# Patient Record
Sex: FEMALE | Race: WHITE | Employment: FULL TIME | URBAN - METROPOLITAN AREA
[De-identification: names, ages, dates, MRNs, and addresses within clinical notes are randomized per-mention and may not be internally consistent; named-entity substitution may affect disease eponyms.]

---

## 2017-05-31 ENCOUNTER — HOSPITAL ENCOUNTER (OUTPATIENT)
Dept: PHYSICAL THERAPY | Age: 67
Discharge: HOME | End: 2017-05-31
Payer: COMMERCIAL

## 2017-05-31 PROCEDURE — 97162 PT EVAL MOD COMPLEX 30 MIN: CPT | Performed by: PHYSICAL THERAPIST

## 2017-05-31 PROCEDURE — 97110 THERAPEUTIC EXERCISES: CPT | Performed by: PHYSICAL THERAPIST
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
## 2017-06-02 ENCOUNTER — HOSPITAL ENCOUNTER (OUTPATIENT)
Dept: PHYSICAL THERAPY | Age: 67
Discharge: HOME | End: 2017-06-02
Payer: COMMERCIAL

## 2017-06-02 PROCEDURE — 97035 APP MDLTY 1+ULTRASOUND EA 15: CPT

## 2017-06-02 PROCEDURE — 97110 THERAPEUTIC EXERCISES: CPT

## 2017-06-02 PROCEDURE — 97014 ELECTRIC STIMULATION THERAPY: CPT

## 2017-06-02 PROCEDURE — 97140 MANUAL THERAPY 1/> REGIONS: CPT

## 2017-06-02 NOTE — PROGRESS NOTES
PT DAILY TREATMENT NOTE     Patient Name: Nadege Callaway  Date:2017  : 1950  [x]  Patient  Verified  Payor: Alisha Giron / [place]: [place] [employer]  [facility] PT / Product Type: Commerical /    In time:10:57  Out time:12:00  Total Treatment Time (min): 61  Visit #: 2 of     Treatment Area: Pain in right knee [M25.561]  Pain in left knee [M25.562]    SUBJECTIVE  Pain Level IN: (0-10 scale): (R) 1/10 and (L) 2-3/10   Pain Level OUT: (0-10 scale) post treatment: numb    Any medication changes, allergies to medications, adverse drug reactions, diagnosis change, or new procedure performed?: [x] No    [] Yes (see summary sheet for update)  Subjective functional status/changes:   [] No changes reported  I would say that the (L) knee hurts worse than my (R) one- but they both kind of hurt in the same spot over the inside of my knees    OBJECTIVE    Modality rationale: decrease edema, decrease inflammation and decrease pain to improve the patients ability to ambulate and perform ADLs   Min Type Additional Details   15+5 set [x] Estim:  [x]Unatt       [x]IFC  []Premod                        []Other:  [x]w/ice   []w/heat  Position: in long sitting  Location: to (B) knee - medially    [] Estim: []Att    []TENS instruct  []NMES                    []Other:  []w/US   []w/ice   []w/heat  Position:  Location:    []  Traction: [] Cervical       []Lumbar                       [] Prone          []Supine                       []Intermittent   []Continuous Lbs:  [] before manual  [] after manual   10 [x]  Ultrasound: []Continuous   [x] Pulsed                           [x]1MHz   []3MHz W/cm2: 1.5  Location: 5 min each to medial knee and bursa     []  Iontophoresis with dexamethasone         Location: [] Take home patch   [] In clinic    []  Ice     []  heat  []  Ice massage  []  Laser   []  Anodyne Position:  Location:    []  Laser with stim  []  Other:  Position:  Location:    []  Vasopneumatic Device Pressure:       [] lo [] med [] hi   Temperature: [] lo [] med [] hi   [] Skin assessment post-treatment:  []intact []redness- no adverse reaction    []redness - adverse reaction:       23 min Therapeutic Exercise:  [] See flow sheet : first follow up visit since initial evaluation - initiated POC per flow sheet    Rationale: increase ROM, increase strength and improve balance to improve the patients ability to achieve below goals      10 min Manual Therapy:  PROM to the (L) knee, patella mobs and CFM over (B) medial knee structures and gentle edema massage to (B) pes anserinus bursa    Rationale: decrease pain, increase ROM, increase tissue extensibility, decrease edema  and decrease trigger points to (B) knees       With   [] TE   [] TA   [] neuro   [] other: Patient Education: [x] Review HEP    [] Progressed/Changed HEP based on:   [] positioning   [] body mechanics   [] transfers   [] heat/ice application    [] other:      Other Objective/Functional Measures: first follow up visit since initial evaluation - initiated POC per flow sheet       Performed pulsed US to (B) pes anserinus area - visible and palpable edema to both (L)>(R) - with increase discomfort reported with minimal palpable to arae           ASSESSMENT/Changes in Function: patient was challenged with first follow up visit with exercise secondary to weakness and fatigue noted and pain over (B) pes anserinus - will progress as able towards goals     Patient will continue to benefit from skilled PT services to modify and progress therapeutic interventions, address functional mobility deficits, address ROM deficits, address strength deficits, analyze and address soft tissue restrictions, analyze and cue movement patterns, assess and modify postural abnormalities and instruct in home and community integration to attain remaining goals.      [x]  See Plan of Care  []  See progress note/recertification  []  See Discharge Summary         Progress towards goals / Updated goals:  · Short Term Goals: To be accomplished in 1 weeks:  1. Pt will be IND and compliant with HEP for self-management of symptoms. · Long Term Goals: To be accomplished in 4 weeks:  1. Pt will improve B LE strength to 5/5 to improve gait stability. 2. Pt will improve B eccentric Quad strength as noted with ability to perform 6\" step down with good form to improve stair negotiation. 3. Pt will be able to stand and walk for 30 min without restrictions to return to [facility] symptom free.   4. Pt will improve FOTO score to at least 64/100 as a functional indicator of improved mobility    PLAN  [x]  Upgrade activities as tolerated     [x]  Continue plan of care  []  Update interventions per flow sheet       []  Discharge due to:_  []  Other:_      Taylor De Souza, GARRET 6/2/2017  11:22 AM    Future Appointments  Date Time Provider Alda Fields   6/5/2017 11:30 AM Sandra Simon DPT [facility]   6/7/2017 11:30 AM Sandra Simon DPT [facility]   6/12/2017 12:00 PM Sandra Simon DPT [facility]   6/14/2017 11:30 AM Sandra Simon DPT [facility]   6/16/2017 11:30 AM [facility] PT [place] [facility]   6/19/2017 11:30 AM [facility] [place] [facility]   6/21/2017 11:30 AM Sandra Simon DPT [facility]   6/23/2017 11:30 AM [facility] [place] [facility]   6/26/2017 3:00 PM [facility] [place] [facility]   6/28/2017 11:30 AM Sandra Simon DPT [facility]   6/30/2017 10:30 AM Sandra Simon DPT [facility]

## 2017-06-05 ENCOUNTER — HOSPITAL ENCOUNTER (OUTPATIENT)
Dept: PHYSICAL THERAPY | Age: 67
Discharge: HOME | End: 2017-06-05
Payer: COMMERCIAL

## 2017-06-05 PROCEDURE — 97014 ELECTRIC STIMULATION THERAPY: CPT | Performed by: PHYSICAL THERAPIST

## 2017-06-05 PROCEDURE — 97110 THERAPEUTIC EXERCISES: CPT | Performed by: PHYSICAL THERAPIST
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
## 2017-06-07 ENCOUNTER — HOSPITAL ENCOUNTER (OUTPATIENT)
Dept: PHYSICAL THERAPY | Age: 67
Discharge: HOME | End: 2017-06-07
Payer: COMMERCIAL

## 2017-06-07 PROCEDURE — 97035 APP MDLTY 1+ULTRASOUND EA 15: CPT | Performed by: PHYSICAL THERAPIST

## 2017-06-07 PROCEDURE — 97110 THERAPEUTIC EXERCISES: CPT | Performed by: PHYSICAL THERAPIST

## 2017-06-07 NOTE — PROGRESS NOTES
PT DAILY TREATMENT NOTE     Patient Name: Mickie Hearn  Date:2017  : 1950  [x]  Patient  Verified  Payor: Patty Llamas / Plan: [place] [employer]  CAPITATED PT / Product Type: Commerical /    In time:1127  Out time:1235  Total Treatment Time (min): 68  Visit #: 4 of     Treatment Area: Pain in right knee [M25.561]  Pain in left knee [M25.562]    SUBJECTIVE  Pain Level IN: (0-10 scale): 3.5/10  Pain Level OUT: (0-10 scale) post treatment: 10    Any medication changes, allergies to medications, adverse drug reactions, diagnosis change, or new procedure performed?: [x] No    [] Yes (see summary sheet for update)  Subjective functional status/changes:   [] No changes reported  Pt reports that she was really sore after last session.      OBJECTIVE    Modality rationale: decrease edema, decrease inflammation and decrease pain to improve the patients ability to ambulate and perform ADLs   Min Type Additional Details   H [x] Estim:  [x]Unatt       [x]IFC  []Premod                        []Other:  [x]w/ice   []w/heat  Position: in long sitting  Location: to (B) knee - medially    [] Estim: []Att    []TENS instruct  []NMES                    []Other:  []w/US   []w/ice   []w/heat  Position:  Location:    []  Traction: [] Cervical       []Lumbar                       [] Prone          []Supine                       []Intermittent   []Continuous Lbs:  [] before manual  [] after manual   8 [x]  Ultrasound: []Continuous   [x] Pulsed                           [x]1MHz   []3MHz W/cm2: 1.0  Location: B pes Anserine    []  Iontophoresis with dexamethasone         Location: [] Take home patch   [] In clinic   10 [x]  Ice     []  heat  []  Ice massage  []  Laser   []  Anodyne Position:semi-reclined  Location: B knees    []  Laser with stim  []  Other:  Position:  Location:    []  Vasopneumatic Device Pressure:       [] lo [] med [] hi   Temperature: [] lo [] med [] hi   [] Skin assessment post-treatment:  []intact []redness- no adverse reaction    []redness - adverse reaction:       50 min Therapeutic Exercise:  [x] See flow sheet :    Rationale: increase ROM, increase strength and improve balance to improve the patients ability to achieve below goals      H min Manual Therapy:  PROM to the (L) knee, patella mobs and CFM over (B) medial knee structures and gentle edema massage to (B) pes anserinus bursa    Rationale: decrease pain, increase ROM, increase tissue extensibility, decrease edema  and decrease trigger points to (B) knees       With   [] TE   [] TA   [] neuro   [] other: Patient Education: [x] Review HEP    [] Progressed/Changed HEP based on:   [] positioning   [] body mechanics   [] transfers   [] heat/ice application    [] other:      Other Objective/Functional Measures:  Pt reported no change in symptoms with IFC last session. Would like to try US instead today  Pt tolerated all therex without increased pain      ASSESSMENT/Changes in Function:   Pt tolerated today's therex with overall decrease in pain levels. Continue to progress as tolerated per current POC. Patient will continue to benefit from skilled PT services to modify and progress therapeutic interventions, address functional mobility deficits, address ROM deficits, address strength deficits, analyze and address soft tissue restrictions, analyze and cue movement patterns, assess and modify postural abnormalities and instruct in home and community integration to attain remaining goals. Progress towards goals / Updated goals: · Short Term Goals: To be accomplished in 1 weeks:  1. Pt will be IND and compliant with HEP for self-management of symptoms. MET PER PT REPORT 6/5/17  · Long Term Goals: To be accomplished in 4 weeks:  1. Pt will improve B LE strength to 5/5 to improve gait stability. 2. Pt will improve B eccentric Quad strength as noted with ability to perform 6\" step down with good form to improve stair negotiation.   3. Pt will be able to stand and walk for 30 min without restrictions to return to OF symptom free.   4. Pt will improve FOTO score to at least 64/100 as a functional indicator of improved mobility    PLAN  [x]  Upgrade activities as tolerated     [x]  Continue plan of care  []  Update interventions per flow sheet       []  Discharge due to:_  []  Other:_      Blanca Kilpatrick DPT 6/7/2017  239 pm    Future Appointments  Date Time Provider Alda Fields   6/12/2017 11:30 AM Blanca Kilpatrick DPT [facility]   6/14/2017 11:30 AM Blanca Kilpatrick DPT [facility]   6/16/2017 11:30 AM [facility] PT [facility]   6/19/2017 11:30 AM [facility] PT [facility]   6/21/2017 11:30 AM Blanca Kilpatrick DPT [facility]   6/23/2017 11:30 AM [facility] PT [facility]   6/26/2017 3:00 PM [facility] PT [facility]   6/28/2017 11:30 AM Blanca Kilpatrick DPT [facility]   6/30/2017 10:30 AM Blanca Kilpatrick DPT [facility]

## 2017-06-09 ENCOUNTER — APPOINTMENT (OUTPATIENT)
Dept: PHYSICAL THERAPY | Age: 67
End: 2017-06-09
Payer: COMMERCIAL

## 2017-06-12 ENCOUNTER — HOSPITAL ENCOUNTER (OUTPATIENT)
Dept: PHYSICAL THERAPY | Age: 67
Discharge: HOME | End: 2017-06-12
Payer: COMMERCIAL

## 2017-06-12 PROCEDURE — 97035 APP MDLTY 1+ULTRASOUND EA 15: CPT

## 2017-06-12 PROCEDURE — 97110 THERAPEUTIC EXERCISES: CPT

## 2017-06-12 NOTE — PROGRESS NOTES
PT DAILY TREATMENT NOTE     Patient Name: Marcin Busch  Date:2017  : 1950  [x]  Patient  Verified  Payor: Aaron Velazquez / [place]: [place] [employer]  CAPITATED PT / Product Type: Commerical /    In time:1135  Out time:1247  Total Treatment Time (min): 62  Visit #: 5 of     Treatment Area: Pain in right knee [M25.561]  Pain in left knee [M25.562]    SUBJECTIVE  Pain Level IN: (0-10 scale): 2/10 (L) and 0/10 (R)  Pain Level OUT: (0-10 scale) post treatment: 0/10    Any medication changes, allergies to medications, adverse drug reactions, diagnosis change, or new procedure performed?: [x] No    [] Yes (see summary sheet for update)  Subjective functional status/changes:   [] No changes reported  I still feel that knot under my skin on the (L) knee that hurts and the (R) knee really only hurts when I push down into that spot.      OBJECTIVE    Modality rationale: decrease edema, decrease inflammation and decrease pain to improve the patients ability to ambulate and perform ADLs   Min Type Additional Details   H [x] Estim:  [x]Unatt       [x]IFC  []Premod                        []Other:  [x]w/ice   []w/heat  Position: in long sitting  Location: to (B) knee - medially    [] Estim: []Att    []TENS instruct  []NMES                    []Other:  []w/US   []w/ice   []w/heat  Position:  Location:    []  Traction: [] Cervical       []Lumbar                       [] Prone          []Supine                       []Intermittent   []Continuous Lbs:  [] before manual  [] after manual   10 [x]  Ultrasound: []Continuous   [x] Pulsed                           [x]1MHz   []3MHz W/cm2: 1.5 - 5 mins to each   Location: B pes Anserine    []  Iontophoresis with dexamethasone         Location: [] Take home patch   [] In clinic   10 [x]  Ice     []  heat  []  Ice massage  []  Laser   []  Anodyne Position:semi-reclined  Location: B knees    []  Laser with stim  []  Other:  Position:  Location:    []  Vasopneumatic Device Pressure: [] lo [] med [] hi   Temperature: [] lo [] med [] hi   [] Skin assessment post-treatment:  []intact []redness- no adverse reaction    []redness - adverse reaction:       42/37 min Therapeutic Exercise:  [x] See flow sheet : 5 min NC for warm up on bike   Rationale: increase ROM, increase strength and improve balance to improve the patients ability to achieve below goals       min Manual Therapy:    Rationale: decrease pain, increase ROM, increase tissue extensibility, decrease edema  and decrease trigger points to (B) knees       With   [] TE   [] TA   [] neuro   [] other: Patient Education: [x] Review HEP    [] Progressed/Changed HEP based on:   [] positioning   [] body mechanics   [] transfers   [] heat/ice application    [] other:      Other Objective/Functional Measures: patient continues to presents with a firm area to the medial aspect of (L) knee close to the pes anserinus         ASSESSMENT/Changes in Function:   Pt tolerated treatment well today - continues to present with pain with palpation over (B) pes anserinus the (L) greater than the (R). Patient will continue to benefit from skilled PT services to modify and progress therapeutic interventions, address functional mobility deficits, address ROM deficits, address strength deficits, analyze and address soft tissue restrictions, analyze and cue movement patterns, assess and modify postural abnormalities and instruct in home and community integration to attain remaining goals. Progress towards goals / Updated goals: · Short Term Goals: To be accomplished in 1 weeks:  1. Pt will be IND and compliant with HEP for self-management of symptoms. MET PER PT REPORT 6/5/17  · Long Term Goals: To be accomplished in 4 weeks:  1. Pt will improve B LE strength to 5/5 to improve gait stability. 2. Pt will improve B eccentric Quad strength as noted with ability to perform 6\" step down with good form to improve stair negotiation.   3. Pt will be able to stand and walk for 30 min without restrictions to return to OF symptom free.   4. Pt will improve FOTO score to at least 64/100 as a functional indicator of improved mobility    PLAN  [x]  Upgrade activities as tolerated     [x]  Continue plan of care  []  Update interventions per flow sheet       []  Discharge due to:_  []  Other:_      Sudarshan Sanchez, PTA 6/12/2017  239 pm    Future Appointments  Date Time Provider Alda iFelds   6/14/2017 11:30 AM Bishnu Chase DPT [facility]   6/16/2017 11:30 AM [facility] PT [place] [facility]   6/19/2017 11:30 AM [facility] [place] [facility]   6/21/2017 11:30 AM Bishnu Chase DPT [facility]   6/23/2017 11:30 AM [facility] PT [place] [facility]   6/26/2017 3:00 PM [facility] PT [facility]   6/28/2017 11:30 AM Bishnu Chase DPT [facility]   6/30/2017 10:30 AM Bishnu Chase DPT [facility]

## 2017-06-14 ENCOUNTER — HOSPITAL ENCOUNTER (OUTPATIENT)
Dept: PHYSICAL THERAPY | Age: 67
Discharge: HOME | End: 2017-06-14
Payer: COMMERCIAL

## 2017-06-14 PROCEDURE — 97110 THERAPEUTIC EXERCISES: CPT | Performed by: PHYSICAL THERAPIST

## 2017-06-14 PROCEDURE — 97035 APP MDLTY 1+ULTRASOUND EA 15: CPT | Performed by: PHYSICAL THERAPIST

## 2017-06-14 NOTE — PROGRESS NOTES
PT DAILY TREATMENT NOTE     Patient Name: Cem David  Date:2017  : 1950  [x]  Patient  Verified  Payor: Jorge Malhotra / [place]: [place] [employer]  CAPITATED PT / Product Type: Commerical /    In time:1134  Out time:1237  Total Treatment Time (min): 61  Visit #: 6 of     Treatment Area: Pain in right knee [M25.561]  Pain in left knee [M25.562]    SUBJECTIVE  Pain Level IN: (0-10 scale): 0/10 (L) and 0/10 (R)  Pain Level OUT: (0-10 scale) post treatment: 0/10    Any medication changes, allergies to medications, adverse drug reactions, diagnosis change, or new procedure performed?: [x] No    [] Yes (see summary sheet for update)  Subjective functional status/changes:   [] No changes reported  \"I am still achy but I am feeling good\"    OBJECTIVE    Modality rationale: decrease edema, decrease inflammation and decrease pain to improve the patients ability to ambulate and perform ADLs   Min Type Additional Details   H [x] Estim:  [x]Unatt       [x]IFC  []Premod                        []Other:  [x]w/ice   []w/heat  Position: in long sitting  Location: to (B) knee - medially    [] Estim: []Att    []TENS instruct  []NMES                    []Other:  []w/US   []w/ice   []w/heat  Position:  Location:    []  Traction: [] Cervical       []Lumbar                       [] Prone          []Supine                       []Intermittent   []Continuous Lbs:  [] before manual  [] after manual   8 [x]  Ultrasound: []Continuous   [x] Pulsed                           [x]1MHz   []3MHz W/cm2: 1.5 - 4 mins to each   Location: B pes Anserine    []  Iontophoresis with dexamethasone         Location: [] Take home patch   [] In clinic   10 [x]  Ice     []  heat  []  Ice massage  []  Laser   []  Anodyne Position:semi-reclined  Location: B knees    []  Laser with stim  []  Other:  Position:  Location:    []  Vasopneumatic Device Pressure:       [] lo [] med [] hi   Temperature: [] lo [] med [] hi   [] Skin assessment post-treatment: []intact []redness- no adverse reaction    []redness - adverse reaction:       45 min Therapeutic Exercise:  [x] See flow sheet :    Rationale: increase ROM, increase strength and improve balance to improve the patients ability to achieve below goals    With   [] TE   [] TA   [] neuro   [] other: Patient Education: [x] Review HEP    [] Progressed/Changed HEP based on:   [] positioning   [] body mechanics   [] transfers   [] heat/ice application    [] other:      Other Objective/Functional Measures:       ASSESSMENT/Changes in Function:   Pt tolerated all therex without increased pain levels. Continue to progress as tolerated per current POC. Patient will continue to benefit from skilled PT services to modify and progress therapeutic interventions, address functional mobility deficits, address ROM deficits, address strength deficits, analyze and address soft tissue restrictions, analyze and cue movement patterns, assess and modify postural abnormalities and instruct in home and community integration to attain remaining goals. Progress towards goals / Updated goals: · Short Term Goals: To be accomplished in 1 weeks:  1. Pt will be IND and compliant with HEP for self-management of symptoms. MET PER PT REPORT 6/5/17  · Long Term Goals: To be accomplished in 4 weeks:  1. Pt will improve B LE strength to 5/5 to improve gait stability. 2. Pt will improve B eccentric Quad strength as noted with ability to perform 6\" step down with good form to improve stair negotiation. 3. Pt will be able to stand and walk for 30 min without restrictions to return to PLOF symptom free. PROGRESSING AS NOTED WITH DECREASED PAIN LEVELS WITH FUNCITONAL ACTIVITIES. 6/14/17  4.  Pt will improve FOTO score to at least 64/100 as a functional indicator of improved mobility    PLAN  [x]  Upgrade activities as tolerated     [x]  Continue plan of care  []  Update interventions per flow sheet       []  Discharge due to:_  []  Other:_ Denver Sera, DPT 6/14/2017  247 pm    Future Appointments  Date Time Provider Alda Fields   6/16/2017 11:30 AM [facility]   6/19/2017 11:30 AM [facility] PT [place] [facility]   6/21/2017 11:30 AM Denver Sera, DPT [facility]   6/23/2017 11:30 AM [facility] PT [place] [facility]   6/26/2017 3:00 PM Denver Sera, DPT [facility]   6/28/2017 11:30 AM Denver Sera, DPT [facility]   6/30/2017 10:30 AM Denver Sera, DPT [facility]

## 2017-06-16 ENCOUNTER — HOSPITAL ENCOUNTER (OUTPATIENT)
Dept: PHYSICAL THERAPY | Age: 67
Discharge: HOME | End: 2017-06-16
Payer: COMMERCIAL

## 2017-06-16 PROCEDURE — 97035 APP MDLTY 1+ULTRASOUND EA 15: CPT

## 2017-06-16 PROCEDURE — 97110 THERAPEUTIC EXERCISES: CPT

## 2017-06-16 NOTE — PROGRESS NOTES
PT DAILY TREATMENT NOTE     Patient Name: Zca Chavez  Date:2017  : 1950  [x]  Patient  Verified  Payor: Manish Cerna / Plan: [place] [employer]  CAPITATED PT / Product Type: Commerical /    In time: 11:37  Out time: 12:53  Total Treatment Time (min): 76  Visit #: 7 of     Treatment Area: Pain in right knee [M25.561]  Pain in left knee [M25.562]    SUBJECTIVE  Pain Level (0-10 scale): 0  Any medication changes, allergies to medications, adverse drug reactions, diagnosis change, or new procedure performed?: [x] No    [] Yes (see summary sheet for update)  Subjective functional status/changes:   [] No changes reported  \"I'm feeling much safer going down stairs. I used to have to go down (non-reciprocally), but now I can do it normally\". \"I'm going to see the doctor next Friday and I think I'm Elo Murguia ask him to inject the left knee because I think I have some bursa problems\". Pt states she will be out of town for 2 months after next Friday.     OBJECTIVE    Modality rationale: decrease edema, decrease inflammation and decrease pain to improve the patients ability to ambulate and perform ADLs   Min Type Additional Details   n/d [x] Estim:  [x]Unatt       [x]IFC  []Premod                        []Other:  [x]w/ice   []w/heat  Position: in long sitting  Location: to (B) knee- medially    [] Estim: []Att    []TENS instruct  []NMES                    []Other:  []w/US   []w/ice   []w/heat  Position:  Location:    []  Traction: [] Cervical       []Lumbar                       [] Prone          []Supine                       []Intermittent   []Continuous Lbs:  [] before manual  [] after manual   15 [x]  Ultrasound: []Continuous   [x] Pulsed                           [x]1MHz   []3MHz W/cm2: 1.5; 7 mins to each  Location: B pes Anserine    []  Iontophoresis with dexamethasone         Location: [] Take home patch   [] In clinic   p/d [x]  Ice     []  heat  []  Ice massage  []  Laser   []  Anodyne Position: semi-reclined  Location: B knees    []  Laser with stim  []  Other:  Position:  Location:    []  Vasopneumatic Device Pressure:       [] lo [] med [] hi   Temperature: [] lo [] med [] hi   [x] Skin assessment post-treatment:  [x]intact []redness- no adverse reaction    []redness - adverse reaction:         55 min Therapeutic Exercise:  [x] See flow sheet (-5 minutes bike)   Rationale: increase ROM, increase strength, improve balance and increase proprioception to improve the patients ability to perform functional mobility/ADLs and attain goals. With   [] TE   [] TA   [] neuro   [] other: Patient Education: [x] Review HEP    [] Progressed/Changed HEP based on:   [] positioning   [] body mechanics   [] transfers   [] heat/ice application    [] other:      Other Objective/Functional Measures:   -cues for form with lateral step ups to increase glute firing.  - increased step height for lateral/fwd step ups.   -added TG squats  -increased resistance with LAQ. Pain Level (0-10 scale) post treatment: 0    ASSESSMENT/Changes in Function: Pt demonstrates slow, steady progress with bilateral knee strength and mechanics as per ability to increase reps/resistance as per flow sheet. Pt with no adverse signs of pain c/o during today's session. She did have transient c/o dizziness on coming supine to sit which she states is related to recent sinus infection. Patient will continue to benefit from skilled PT services to modify and progress therapeutic interventions, address functional mobility deficits, address ROM deficits, address strength deficits, analyze and address soft tissue restrictions and analyze and cue movement patterns to attain remaining goals. []  See Plan of Care  []  See progress note/recertification  []  See Discharge Summary         Progress towards goals / Updated goals: · Short Term Goals: To be accomplished in 1 weeks:  1. Pt will be IND and compliant with HEP for self-management of symptoms. MET PER PT REPORT 6/5/17  · Long Term Goals: To be accomplished in 4 weeks:  1. Pt will improve B LE strength to 5/5 to improve gait stability. 2. Pt will improve B eccentric Quad strength as noted with ability to perform 6\" step down with good form to improve stair negotiation. 3. Pt will be able to stand and walk for 30 min without restrictions to return to PLOF symptom free. PROGRESSING AS NOTED WITH DECREASED PAIN LEVELS WITH FUNCITONAL ACTIVITIES. 6/14/17  4. Pt will improve FOTO score to at least 64/100 as a functional indicator of improved mobility    PLAN  []  Upgrade activities as tolerated     [x]  Continue plan of care  []  Update interventions per flow sheet       []  Discharge due to:_  []  Other:_      Ashwin Quiñonez, PT 6/16/2017  8:26 AM    Future Appointments  Date Time Provider Alda Fields   6/16/2017 11:30 AM [facility] PT [place] [facility]   6/19/2017 11:30 AM [facility]   6/21/2017 11:30 AM Saray Hernandes DPT [facility]   6/23/2017 11:30 AM [facility]   6/26/2017 3:00 PM Saray Hernandes DPT [facility]   6/28/2017 11:30 AM Saary Hernandes DPT [facility]   6/30/2017 10:30 AM Saray Hernandes DPT [facility]

## 2017-06-19 ENCOUNTER — HOSPITAL ENCOUNTER (OUTPATIENT)
Dept: PHYSICAL THERAPY | Age: 67
Discharge: HOME | End: 2017-06-19
Payer: COMMERCIAL

## 2017-06-19 PROCEDURE — 97110 THERAPEUTIC EXERCISES: CPT

## 2017-06-19 PROCEDURE — 97035 APP MDLTY 1+ULTRASOUND EA 15: CPT

## 2017-06-19 NOTE — PROGRESS NOTES
PT DAILY TREATMENT NOTE     Patient Name: Josie Borden  Date:2017  : 1950  [x]  Patient  Verified  Payor: Lory Diggs / Plan: [employer]  CAPITATED PT / Product Type: Commerical /    In time: 11:35  Out time: 12:39  Total Treatment Time (min): 64  Visit #: 8 of     Treatment Area: Pain in right knee [M25.561]  Pain in left knee [M25.562]    SUBJECTIVE  Pain Level (0-10 scale): 0  Any medication changes, allergies to medications, adverse drug reactions, diagnosis change, or new procedure performed?: [x] No    [] Yes (see summary sheet for update)  Subjective functional status/changes:   [] No changes reported  \"I did a lot of walking over the weekend. Went to [employer]. [place] a little tired after, not too painful\"      OBJECTIVE    Modality rationale: decrease pain to improve the patients ability to perform functional mobility/ADLs and attain goals.    Min Type Additional Details    [] Estim:  []Unatt       []IFC  []Premod                        []Other:  []w/ice   []w/heat  Position:  Location:    [] Estim: []Att    []TENS instruct  []NMES                    []Other:  []w/US   []w/ice   []w/heat  Position:  Location:    []  Traction: [] Cervical       []Lumbar                       [] Prone          []Supine                       []Intermittent   []Continuous Lbs:  [] before manual  [] after manual   10+3 minutes set up [x]  Ultrasound: []Continuous   [x] Pulsed                           [x]1MHz   []3MHz W/cm2: 1.5; 5 mins to each  Location: B pes anserine    []  Iontophoresis with dexamethasone         Location: [] Take home patch   [] In clinic    []  Ice     []  heat  []  Ice massage  []  Laser   []  Anodyne Position:  Location:    []  Laser with stim  []  Other:  Position:  Location:    []  Vasopneumatic Device Pressure:       [] lo [] med [] hi   Temperature: [] lo [] med [] hi   [] Skin assessment post-treatment:  []intact []redness- no adverse reaction    []redness - adverse reaction: 46 min Therapeutic Exercise:  [x] See flow sheet :(-5 minutes bike)   Rationale: increase ROM, increase strength, improve balance and increase proprioception to improve the patients ability to perform functional mobility/ADLs and attain goals. With   [] TE   [] TA   [] neuro   [] other: Patient Education: [x] Review HEP    [] Progressed/Changed HEP based on:   [] positioning   [] body mechanics   [] transfers   [] heat/ice application    [] other:      Other Objective/Functional Measures:   -progressed reps/resistance with TG squats     Pain Level (0-10 scale) post treatment: 0    ASSESSMENT/Changes in Function: Pt demonstrates improving strength/endurance with exercises as per decreased rest periods required today. Pt with ongoing TTP to left pes anserine and moderate density nodule at right infero-medial knee joint. Plan to continue progression of PT for goal attainment as tolerated. Patient will continue to benefit from skilled PT services to modify and progress therapeutic interventions, address functional mobility deficits, address ROM deficits, address strength deficits, analyze and address soft tissue restrictions, analyze and cue movement patterns and analyze and modify body mechanics/ergonomics to attain remaining goals. []  See Plan of Care  []  See progress note/recertification  []  See Discharge Summary         Progress towards goals / Updated goals: · Short Term Goals: To be accomplished in 1 weeks:  1. Pt will be IND and compliant with HEP for self-management of symptoms. MET PER PT REPORT 6/5/17  · Long Term Goals: To be accomplished in 4 weeks:  1. Pt will improve B LE strength to 5/5 to improve gait stability. 2. Pt will improve B eccentric Quad strength as noted with ability to perform 6\" step down with good form to improve stair negotiation. 3. Pt will be able to stand and walk for 30 min without restrictions to return to PLOF symptom free. -6/19:  GOAL MET; amb. 2 hours in grocery store over weekend. 4. Pt will improve FOTO score to at least 64/100 as a functional indicator of improved mobility    PLAN  []  Upgrade activities as tolerated     [x]  Continue plan of care  []  Update interventions per flow sheet       []  Discharge due to:_  []  Other:_      Ed Quiñonez, PT 6/19/2017  11:55 AM    Future Appointments  Date Time Provider Alda Fields   6/21/2017 11:30 AM Cortney Stewart DPT [facility]   6/23/2017 11:30 AM [facility] PT APRYL 1 [facility]   6/26/2017 3:00 PM Cortney Stewart DPT [facility]   6/28/2017 11:30 AM Cortney Stewart DPT [facility]   6/30/2017 10:30 AM Cortney Stewart DPT [facility]

## 2017-06-21 ENCOUNTER — HOSPITAL ENCOUNTER (OUTPATIENT)
Dept: PHYSICAL THERAPY | Age: 67
Discharge: HOME | End: 2017-06-21
Payer: COMMERCIAL

## 2017-06-21 ENCOUNTER — APPOINTMENT (OUTPATIENT)
Dept: PHYSICAL THERAPY | Age: 67
End: 2017-06-21
Payer: COMMERCIAL

## 2017-06-21 PROCEDURE — 97110 THERAPEUTIC EXERCISES: CPT | Performed by: PHYSICAL THERAPIST
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
## 2017-06-21 NOTE — PROGRESS NOTES
PT DAILY TREATMENT NOTE     Patient Name: Suzanne Eng  Date:2017  : 1950  [x]  Patient  Verified  Payor: Dequan Fonseca / [place] [employer]  [facility] Type: Commer[facility] /    In time: 11:34  Out time: 1240  Total Treatment Time (min): 66  Visit #: 9 of     Treatment Area: Pain in right knee [M25.561]  Pain in left knee [M25.562]    SUBJECTIVE  Pain Level (0-10 scale): 0  Any medication changes, allergies to medications, adverse drug reactions, diagnosis change, or new procedure performed?: [x] No    [] Yes (see summary sheet for update)  Subjective functional status/changes:   [] No changes reported  Pt reports 75% improvement of symptoms since SOC. She reports overall improvements in pain levels, swelling, and improvements in strength. Pt reports she continues to have swelling and bruising. Pt denies functional limitations at this time. She states that her knees are functioning just as well as they were before the accident. OBJECTIVE    Modality rationale: decrease pain to improve the patients ability to perform functional mobility/ADLs and attain goals.    Min Type Additional Details    [] Estim:  []Unatt       []IFC  []Premod                        []Other:  []w/ice   []w/heat  Position:  Location:    [] Estim: []Att    []TENS instruct  []NMES                    []Other:  []w/US   []w/ice   []w/heat  Position:  Location:    []  Traction: [] Cervical       []Lumbar                       [] Prone          []Supine                       []Intermittent   []Continuous Lbs:  [] before manual  [] after manual    []  Ultrasound: []Continuous   [] Pulsed                           []1MHz   []3MHz W/cm2:   Location:    []  Iontophoresis with dexamethasone         Location: [] Take home patch   [] In clinic   10 [x]  Ice     []  heat  []  Ice massage  []  Laser   []  Anodyne Position: semi-reclined  Location: B knees    []  Laser with stim  []  Other:  Position:  Location:    [] Vasopneumatic Device Pressure:       [] lo [] med [] hi   Temperature: [] lo [] med [] hi   [] Skin assessment post-treatment:  []intact []redness- no adverse reaction    []redness - adverse reaction:       56 min Therapeutic Exercise:  [x] See flow sheet    Rationale: increase ROM, increase strength, improve balance and increase proprioception to improve the patients ability to perform functional mobility/ADLs and attain goals. With   [] TE   [] TA   [] neuro   [] other: Patient Education: [x] Review HEP    [] Progressed/Changed HEP based on:   [] positioning   [] body mechanics   [] transfers   [] heat/ice application    [] other:      Other Objective/Functional Measures:   FOTO improved to 70/100 from 54/100 at eval  B hip strength/knee strength was 5/5 grossly  Pt was able to ascend/descend 4 therapy steps with reciprocal pattern demonstrating good eccentric control with decent B    Updated HEP       Pain Level (0-10 scale) post treatment: 0/10    ASSESSMENT/Changes in Function:   Upon reassessment, pt presents with improvements in B LE strength leading to improvements in functional mobility and activity tolerance. At this time pt is IND with HEP and is leaving [facility] and therefore, is being [address] from PT. Pt given updated HEP to maintain/maximize gains made in PT.     []  See Plan of Care  []  See progress note/recertification  [x]  See Discharge Summary         Progress towards goals / Updated goals: · Short Term Goals: To be accomplished in 1 weeks:  1. Pt will be IND and compliant with HEP for self-management of symptoms. MET PER PT REPORT 6/5/17  · Long Term Goals: To be accomplished in 4 weeks:  1. Pt will improve B LE strength to 5/5 to improve gait stability. MET 6/21/17  2. Pt will improve B eccentric Quad strength as noted with ability to perform 6\" step down with good form to improve stair negotiation. MET 6/21/17  3.  Pt will be able to stand and walk for 30 min without restrictions to return to PLOF symptom free. -6/19:  GOAL MET; amb. 2 hours in grocery store over weekend. 4. Pt will improve FOTO score to at least 64/100 as a functional indicator of improved mobility MET 6/21/17    PLAN  []  Upgrade activities as tolerated     []  Continue plan of care  []  Update interventions per flow sheet       [x]  Discharge due to:all goals met, progressed to IND HEP as pt is going out of town  []  Other:_      Ailyn Mack DPT 6/21/2017  146 PM    No future appointments.

## 2017-06-23 ENCOUNTER — APPOINTMENT (OUTPATIENT)
Dept: PHYSICAL THERAPY | Age: 67
End: 2017-06-23
Payer: COMMERCIAL

## 2017-06-26 ENCOUNTER — APPOINTMENT (OUTPATIENT)
Dept: PHYSICAL THERAPY | Age: 67
End: 2017-06-26
Payer: COMMERCIAL

## 2017-06-28 ENCOUNTER — APPOINTMENT (OUTPATIENT)
Dept: PHYSICAL THERAPY | Age: 67
End: 2017-06-28
Payer: COMMERCIAL

## 2017-06-30 ENCOUNTER — APPOINTMENT (OUTPATIENT)
Dept: PHYSICAL THERAPY | Age: 67
End: 2017-06-30
Payer: COMMERCIAL